# Patient Record
Sex: MALE | Race: WHITE | Employment: FULL TIME | URBAN - METROPOLITAN AREA
[De-identification: names, ages, dates, MRNs, and addresses within clinical notes are randomized per-mention and may not be internally consistent; named-entity substitution may affect disease eponyms.]

---

## 2019-08-12 ENCOUNTER — OFFICE VISIT (OUTPATIENT)
Dept: FAMILY MEDICINE | Facility: CLINIC | Age: 44
End: 2019-08-12
Payer: COMMERCIAL

## 2019-08-12 VITALS
HEART RATE: 85 BPM | HEIGHT: 72 IN | WEIGHT: 306 LBS | SYSTOLIC BLOOD PRESSURE: 138 MMHG | RESPIRATION RATE: 16 BRPM | DIASTOLIC BLOOD PRESSURE: 92 MMHG | TEMPERATURE: 97.4 F | BODY MASS INDEX: 41.45 KG/M2 | OXYGEN SATURATION: 96 %

## 2019-08-12 DIAGNOSIS — R03.0 ELEVATED BP WITHOUT DIAGNOSIS OF HYPERTENSION: ICD-10-CM

## 2019-08-12 DIAGNOSIS — E11.9 TYPE 2 DIABETES MELLITUS WITHOUT COMPLICATION, WITHOUT LONG-TERM CURRENT USE OF INSULIN (H): Primary | ICD-10-CM

## 2019-08-12 DIAGNOSIS — M77.11 LATERAL EPICONDYLITIS, RIGHT ELBOW: ICD-10-CM

## 2019-08-12 DIAGNOSIS — E66.01 MORBID OBESITY (H): ICD-10-CM

## 2019-08-12 LAB — HBA1C MFR BLD: 7.5 % (ref 0–5.6)

## 2019-08-12 PROCEDURE — 80061 LIPID PANEL: CPT | Performed by: NURSE PRACTITIONER

## 2019-08-12 PROCEDURE — 99207 C FOOT EXAM  NO CHARGE: CPT | Performed by: NURSE PRACTITIONER

## 2019-08-12 PROCEDURE — 82043 UR ALBUMIN QUANTITATIVE: CPT | Performed by: NURSE PRACTITIONER

## 2019-08-12 PROCEDURE — 99204 OFFICE O/P NEW MOD 45 MIN: CPT | Performed by: NURSE PRACTITIONER

## 2019-08-12 PROCEDURE — 36415 COLL VENOUS BLD VENIPUNCTURE: CPT | Performed by: NURSE PRACTITIONER

## 2019-08-12 PROCEDURE — 83036 HEMOGLOBIN GLYCOSYLATED A1C: CPT | Performed by: NURSE PRACTITIONER

## 2019-08-12 PROCEDURE — 84443 ASSAY THYROID STIM HORMONE: CPT | Performed by: NURSE PRACTITIONER

## 2019-08-12 PROCEDURE — 80053 COMPREHEN METABOLIC PANEL: CPT | Performed by: NURSE PRACTITIONER

## 2019-08-12 ASSESSMENT — MIFFLIN-ST. JEOR: SCORE: 2307.04

## 2019-08-12 NOTE — LETTER
[address]  August 13, 2019      [place] [address]           Phone :  [phone]          Fax:  [phone]  Jesus Davenport  [address] [place] [id number]      Jesus,     Please find a copy of your recent lab results enclosed.     To summarize your results:     Your microalbumin screening is normal.  This test is used to detect early signs of kidney damage in patients with an increased risk of kidney disease, such as those with diabetes or high blood pressure.    Your thyroid is normal.     Your electrolytes, kidney and liver tests are normal.     Blood sugar is elevated, but consistent with having diabetes.     If you have any questions please let me know.     Sincerely,     Anabella Campbell CNP/krs

## 2019-08-12 NOTE — PROGRESS NOTES
Subjective     Jesus Davenport is a 44 year old male who presents to clinic today for the following health issues:    LIZ Lee is a new patient who is here for medication refills today.   Previous care was at a clinic in [place].   He has since moved to [place].    He was diagnosed with T2DM 2 years ago.   As far as he recalls his diabetes has been well controlled.    He does not have high cholesterol or HTN.  He is only on medication for diabetes.   When he took a higher dose of metfromin he had a lot of GI issues (diarrhea).   He has been under more stress lately with the move and notes he has gained 30# in the last several months.   Typically he exercises regularly, but lately he has not been exercising.  Diet is fair.   Does snack during the day, but doesn't snack at night.    Drinks a lot of water and sugary beverages very rarely.         Diabetes Follow-up      How often are you checking your blood sugar? Not at all    What time of day are you checking your blood sugars (select all that apply)?  n/a    Have you had any blood sugars above 200?      Have you had any blood sugars below 70?      What symptoms do you notice when your blood sugar is low?  None    What concerns do you have today about your diabetes? None     Do you have any of these symptoms? (Select all that apply)  Weight gain     Have you had a diabetic eye exam in the last 12 months? No    Diabetes Management Resources      BP Readings from Last 2 Encounters:   08/12/19 (!) 138/92     Hemoglobin A1C (%)   Date Value   08/12/2019 7.5 (H)       Musculoskeletal problem/pain      Duration: x 2 months    Description  Location: right elbow    Intensity:  mild, moderate    Accompanying signs and symptoms: weakness of forearm and loss of  sometimes    History  Previous similar problem: no   Previous evaluation:  none    Precipitating or alleviating factors:  Trauma or overuse: YES- over use  Aggravating factors include: lifting up certain weight of  "things    Therapies tried and outcome: nothing      Current Outpatient Medications   Medication Sig Dispense Refill     metFORMIN (GLUCOPHAGE) 500 MG tablet Take 1 tablet (500 mg) by mouth daily (with dinner) 90 tablet 1     sitagliptin (JANUVIA) 100 MG tablet Take 1 tablet (100 mg) by mouth daily 90 tablet 1     Allergies   Allergen Reactions     Penicillins        Reviewed and updated as needed this visit by Provider         Review of Systems   ROS COMP: Constitutional, HEENT, cardiovascular, pulmonary, GI, , musculoskeletal, neuro, skin, endocrine and psych systems are negative, except as otherwise noted.      Objective    BP (!) 138/92   Pulse 85   Temp 97.4  F (36.3  C) (Oral)   Resp 16   Ht 1.822 m (5' 11.75\")   Wt 138.8 kg (306 lb)   SpO2 96%   BMI 41.79 kg/m    Body mass index is 41.79 kg/m .  Physical Exam   GENERAL: healthy, alert and no distress  EYES: Eyes grossly normal to inspection, PERRL and conjunctivae and sclerae normal  HENT: ear canals and TM's normal, nose and mouth without ulcers or lesions  NECK: no adenopathy, no asymmetry, masses, or scars and thyroid normal to palpation  RESP: lungs clear to auscultation - no rales, rhonchi or wheezes  CV: regular rate and rhythm, normal S1 S2, no S3 or S4, no murmur, click or rub, no peripheral edema and peripheral pulses strong  ABDOMEN: soft, nontender, no hepatosplenomegaly, no masses and bowel sounds normal  MS: no gross musculoskeletal defects noted, no edema, R elbow: FROM, lateral tenderness  SKIN: no suspicious lesions or rashes  NEURO: Normal strength and tone, mentation intact and speech normal  PSYCH: mentation appears normal, affect normal/bright  Diabetic foot exam: normal DP and PT pulses, no trophic changes or ulcerative lesions, normal sensory exam and normal monofilament exam    Diagnostic Test Results:  Results for orders placed or performed in visit on 08/12/19   Hemoglobin A1c   Result Value Ref Range    Hemoglobin A1C 7.5 (H) " 0 - 5.6 %             Assessment & Plan     1. Type 2 diabetes mellitus without complication, without long-term current use of insulin (H)  Labs today.  A1c looks good and likely would be better if he hadn't recently been gaining weight.  Discussed diet and he is in agreement to meet with diabetic educators.  Ensure plenty of water, try intermittent fasting.  Be mindful of snacking.  Increase exercise.  Due for eye exam.   - Hemoglobin A1c  - Comprehensive metabolic panel  - TSH with free T4 reflex  - Albumin Random Urine Quantitative with Creat Ratio  - DIABETES EDUCATOR REFERRAL  - OPTOMETRY REFERRAL  - sitagliptin (JANUVIA) 100 MG tablet; Take 1 tablet (100 mg) by mouth daily  Dispense: 90 tablet; Refill: 1  - metFORMIN (GLUCOPHAGE) 500 MG tablet; Take 1 tablet (500 mg) by mouth daily (with dinner)  Dispense: 90 tablet; Refill: 1    2. Elevated BP without diagnosis of hypertension  Will need to keep an eye on this.  He reports his BP has always been great.     3. Lateral epicondylitis, right elbow  Rest, ice, elbow strap, NSAID.  If not getting better in next 4-6 weeks can have him see ortho.     4. Morbid obesity (H)  Has been interested in bariatric surgery in the past.  Found out insurance will now cover it.   - BARIATRIC ADULT REFERRAL       Return in about 6 months (around 2/12/2020) for diabetes.    KATHRYN Barton [facility]

## 2019-08-13 ENCOUNTER — TELEPHONE (OUTPATIENT)
Dept: FAMILY MEDICINE | Facility: CLINIC | Age: 44
End: 2019-08-13

## 2019-08-13 DIAGNOSIS — E11.9 TYPE 2 DIABETES MELLITUS WITHOUT COMPLICATION, WITHOUT LONG-TERM CURRENT USE OF INSULIN (H): Primary | ICD-10-CM

## 2019-08-13 LAB
ALBUMIN SERPL-MCNC: 3.3 G/DL (ref 3.4–5)
ALP SERPL-CCNC: 111 U/L (ref 40–150)
ALT SERPL W P-5'-P-CCNC: 38 U/L (ref 0–70)
ANION GAP SERPL CALCULATED.3IONS-SCNC: 9 MMOL/L (ref 3–14)
AST SERPL W P-5'-P-CCNC: 23 U/L (ref 0–45)
BILIRUB SERPL-MCNC: 0.4 MG/DL (ref 0.2–1.3)
BUN SERPL-MCNC: 14 MG/DL (ref 7–30)
CALCIUM SERPL-MCNC: 8.5 MG/DL (ref 8.5–10.1)
CHLORIDE SERPL-SCNC: 106 MMOL/L (ref 94–109)
CHOLEST SERPL-MCNC: 140 MG/DL
CO2 SERPL-SCNC: 25 MMOL/L (ref 20–32)
CREAT SERPL-MCNC: 0.87 MG/DL (ref 0.66–1.25)
CREAT UR-MCNC: 124 MG/DL
GFR SERPL CREATININE-BSD FRML MDRD: >90 ML/MIN/{1.73_M2}
GLUCOSE SERPL-MCNC: 177 MG/DL (ref 70–99)
HDLC SERPL-MCNC: 32 MG/DL
LDLC SERPL CALC-MCNC: 78 MG/DL
MICROALBUMIN UR-MCNC: 8 MG/L
MICROALBUMIN/CREAT UR: 6.48 MG/G CR (ref 0–17)
NONHDLC SERPL-MCNC: 108 MG/DL
POTASSIUM SERPL-SCNC: 4.2 MMOL/L (ref 3.4–5.3)
PROT SERPL-MCNC: 7.2 G/DL (ref 6.8–8.8)
SODIUM SERPL-SCNC: 140 MMOL/L (ref 133–144)
TRIGL SERPL-MCNC: 149 MG/DL
TSH SERPL DL<=0.005 MIU/L-ACNC: 1.49 MU/L (ref 0.4–4)

## 2019-08-13 NOTE — TELEPHONE ENCOUNTER
Diabetes Education Scheduling Outreach #1:    Call to patient to schedule. Left message with phone number to call to schedule.    Plan for 2nd outreach attempt within 1 week.    Deysi Ricardo  [facility]  Diabetes and Nutrition Scheduling

## 2019-09-03 ENCOUNTER — ALLIED HEALTH/NURSE VISIT (OUTPATIENT)
Dept: EDUCATION SERVICES | Facility: CLINIC | Age: 44
End: 2019-09-03
Payer: COMMERCIAL

## 2019-09-03 ENCOUNTER — TELEPHONE (OUTPATIENT)
Dept: EDUCATION SERVICES | Facility: CLINIC | Age: 44
End: 2019-09-03

## 2019-09-03 VITALS — BODY MASS INDEX: 42.38 KG/M2 | WEIGHT: 310.3 LBS

## 2019-09-03 DIAGNOSIS — E11.9 TYPE 2 DIABETES MELLITUS WITHOUT COMPLICATION, WITHOUT LONG-TERM CURRENT USE OF INSULIN (H): Primary | ICD-10-CM

## 2019-09-03 PROCEDURE — G0108 DIAB MANAGE TRN  PER INDIV: HCPCS

## 2019-09-03 NOTE — PROGRESS NOTES
"Diabetes Self-Management Education & Support    Diabetes Education Self Management & Training    SUBJECTIVE/OBJECTIVE:  Presents for: Individual review  Accompanied by: Self  Diabetes education in the past 24mo: No  Diabetes type: Type 2  Disease course: Stable  Diabetes management related comments/concerns: weight gain over the last couple months, other than stress not sure why.  Transportation concerns: No  Other concerns:: None  Cultural Influences/Ethnic Background:  American      Diabetes Symptoms & Complications  Blurred vision: No  Fatigue: Yes  Neuropathy: No  Foot ulcerations: No  Polydipsia: No  Polyphagia: No  Polyuria: No  Visual change: No  Weakness: Yes  Weight loss: No  Slow healing wounds: No  Recent Infection(s): No  Other: No  Symptom course: Stable  Weight trend: Increasing rapidly  Autonomic neuropathy: No  CVA: No  Heart disease: No  Nephropathy: No  Peripheral neuropathy: No  Peripheral Vascular Disease: No  Retinopathy: No  Sexual dysfunction: Yes    Patient Problem List and Family Medical History reviewed for relevant medical history, current medical status, and diabetes risk factors.    Vitals:  Wt 140.8 kg (310 lb 4.8 oz)   BMI 42.38 kg/m    Estimated body mass index is 42.38 kg/m  as calculated from the following:    Height as of 8/12/19: 1.822 m (5' 11.75\").    Weight as of this encounter: 140.8 kg (310 lb 4.8 oz).   Last 3 BP:   BP Readings from Last 3 Encounters:   08/12/19 (!) 138/92       History   Smoking Status     Never Smoker   Smokeless Tobacco     Never Used       Labs:  Lab Results   Component Value Date    A1C 7.5 08/12/2019     Lab Results   Component Value Date     08/12/2019     Lab Results   Component Value Date    LDL 78 08/12/2019     HDL Cholesterol   Date Value Ref Range Status   08/12/2019 32 (L) >39 mg/dL Final   ]  GFR Estimate   Date Value Ref Range Status   08/12/2019 >90 >60 mL/min/[1.73_m2] Final     Comment:     Non  GFR Calc  Starting " 12/18/2018, serum creatinine based estimated GFR (eGFR) will be   calculated using the Chronic Kidney Disease Epidemiology Collaboration   (CKD-EPI) equation.       GFR Estimate If Black   Date Value Ref Range Status   08/12/2019 >90 >60 mL/min/[1.73_m2] Final     Comment:      GFR Calc  Starting 12/18/2018, serum creatinine based estimated GFR (eGFR) will be   calculated using the Chronic Kidney Disease Epidemiology Collaboration   (CKD-EPI) equation.       Lab Results   Component Value Date    CR 0.87 08/12/2019     No results found for: MICROALBUMIN    Healthy Eating  Healthy Eating Assessed Today: Yes  Cultural/Rastafarian diet restrictions?: No  Meal planning: None  Meals include: Breakfast, Lunch, Dinner, Snacks  Breakfast: cottage cheese and blueberies, OR honey nut cheerios with almond milk  Lunch: salad Or leftovers Or sandwich  Dinner: medeteranian Monday, Tuesday- 4 tacos Or chicken, veggies OR throwback thursday- pork chops/meatloaf, vegetables,  fish friday  Snacks: cheez its, tortilla chips and salsa, M&M's, cookies  Beverages: Water, Soda(coke zero)  Has patient met with a dietitian in the past?: No    Being Active  Being Active Assessed Today: Yes  Exercise:: Currently not exercising  Barrier to exercise: Time, Access    Monitoring  Monitoring Assessed Today: Yes  Did patient bring glucose meter to appointment? : No  Blood Glucose Meter: Unknown(does not have a meter and not interested in anythink that pokes fingers)  Home Glucose (Sugar) Monitoring: Never      Taking Medications  Diabetes Medication(s)     Biguanides       metFORMIN (GLUCOPHAGE) 500 MG tablet    Take 1 tablet (500 mg) by mouth daily (with dinner)    Dipeptidyl Peptidase-4 (DPP-4) Inhibitors       sitagliptin (JANUVIA) 100 MG tablet    Take 1 tablet (100 mg) by mouth daily          Taking Medication Assessed Today: Yes  Current Treatments: Oral Agent (dual therapy)  Problems taking diabetes medications regularly?:  No  Diabetes medication side effects?: No  Treatment Compliance: All of the time    Problem Solving  Problem Solving Assessed Today: Yes  Hypoglycemia Frequency: Daily  Hypoglycemia Treatment: Candy  Patient carries a carbohydrate source: No  Medical alert: No  Severe weather/disaster plan for diabetes management?: No  DKA prevention plan?: No  Sick day plan for diabetes management?: (P) No    Hypoglycemia symptoms  Confusion: No  Dizziness or Light-Headedness: No  Headaches: No  Hunger: Yes  Mood changes: Yes  Nervousness/Anxiety: Yes  Sleepiness: Yes  Speech difficulty: No  Sweats: No  Tremors: No    Hypoglycemia Complications  Blackouts: No  Hospitalization: No  Nocturnal hypoglycemia: No  Required assistance: No  Required glucagon injection: No  Seizures: No    Reducing Risks  Diabetes Risks: Age over 45 years, Sedentary Lifestyle, Family History  CAD Risks: Family history, Obesity, Male sex, Diabetes Mellitus  Has dilated eye exam at least once a year?: No  Sees dentist every 6 months?: No  Sees podiatrist (foot doctor)?: No(PCP checks feet)    Healthy Coping  Healthy Coping Assessed Today: Yes  Emotional response to diabetes: Ready to learn  Informal Support system:: Partner  Stage of change: PREPARATION (Decided to change - considering how)  Difficulty affording diabetes management supplies?: No  Patient Activation Measure Survey Score:  LAKIA Score (Last Two) 8/12/2019   LAKIA Raw Score 32   Activation Score 62.6   LAKIA Level 3       ASSESSMENT:  Patient is concerned recent weight gain, he does not feel it's related to his lifestyle.  He would benefit from some lifestyle changes including healthy eating and increased activity. Recommend he start tracking his food intake and activity to see if it is related.    Patient thinks he is having low blood sugars on a daily basis due to not feeling well, his current medication regimen is not likely to cause hypoglycemia, he may be experiencing  hyperglycemia.  He would  "benefit from monitoring glucose but is not willing to \"poke himself\", recommend he try the herb personal sensor for monitoring. Information provided to patient and he is interested in this option.  Request sent to pcp.    Patient's most recent   Lab Results   Component Value Date    A1C 7.5 08/12/2019    is not meeting goal of <7.0    INTERVENTION:   Diabetes knowledge and skills assessment:     Patient is knowledgeable in diabetes management concepts related to: NA    Patient needs further education on the following diabetes management concepts: Healthy Eating, Being Active, Monitoring, Taking Medication, Problem Solving, Reducing Risks and Healthy Coping    Based on learning assessment above, most appropriate setting for further diabetes education would be: Group class or Individual setting.    Education provided today on:  AADE Self-Care Behaviors:  Diabetes Pathophysiology  Healthy Eating: consistency in amount, composition, and timing of food intake, weight reduction, portion control, plate planning method and label reading  Being Active: relationship to blood glucose, describe appropriate activity program and precautions to take  Monitoring: benefits of monitoring, individual blood glucose targets and available devices on the market for monitoring glucose, SBG monitor vs CGM  Taking Medication: action of prescribed medication and side effects of prescribed medications  Problem Solving: high blood glucose - causes, signs/symptoms, treatment and prevention, low blood glucose - causes, signs/symptoms, treatment and prevention and when to call health care provider  Reducing Risks: A1C - goals, relating to blood glucose levels, how often to check  Healthy Coping: benefits of making appropriate lifestyle changes    Opportunities for ongoing education and support in diabetes-self management were discussed.    Pt verbalized understanding of concepts discussed and recommendations provided today.       Education " Materials Provided:  [facility] Understanding Diabetes Booklet, BG Log Sheet, My Plate Planner, 2-4 carbohydrate choice meal plans, snack ideas and herb personal sensor brochure    PLAN:  See Patient Instructions for co-developed, patient-stated behavior change goals.  AVS printed and provided to patient today. See Follow-Up section for recommended follow-up.    Ciera Hinojosa RN,CDE   Time Spent: 65 minutes  Encounter Type: Individual    Any diabetes medication dose changes were made via the CDE Protocol and Collaborative Practice Agreement with the patient's referring provider. A copy of this encounter was shared with the provider.

## 2019-09-03 NOTE — PATIENT INSTRUCTIONS
Care Plan:  Meal Plan Recommendation: eat 3 meals a day, have small snacks between meals, if needed, use portion control and use plate planning method  Exercise / activity plan: track your daily steps   Check blood sugars - check with your pharmacy regarding the katty sensor    Consider a phone jah for tracking food intake such as [employer] or [employer] personal sensor for tracking your glucose.  You can check with [email or web address] for discounts and if the cost is more than $75 then call Katty for additional discount    Follow up:  1 month     Bring blood glucose meter and logbook with you to all doctor and follow-up appointments.     [facility] [facility]:  For Your Diabetes or Nutrition Education Appointments Call:  [phone]   For Diabetes or Nutrition Related Questions Call or Email:   [phone]  [email or web address]  Fax: [phone]   If you need a medication refill please contact your pharmacy. Please allow 3 business days for your refills to be completed.     Instructions for emailing the Diabetes Educators    If you need to communicate a non-urgent message to a Diabetes Educator via email, please send to [email or web address].    Please follow the following email guidelines:    Subject line: Secure: your clinic name (example: Secure: [facility])  In the email please include: First name, middle initial, last name and date of birth.    We will be in touch with you within one (1) business day.

## 2019-09-03 NOTE — TELEPHONE ENCOUNTER
Please see my notes from today.  Patient would benefit from a personal herb sensor.  Jesus is aware of the cost and the possibility insurance may not cover it. I provided the patient with an online discount opportunity through [email or web address] to help with the cost if the insurance does not cover it.   If you agree please send the script in  and route back to me or indicate alternate plan.    Ciera Hinojosa RN,CDE

## 2019-09-03 NOTE — LETTER
"    9/3/2019         RE: Jesus Davenport  [address] [place] [id number]        Dear Colleague,    Thank you for referring your patient, Jesus Davenport, to the [facility]. Please see a copy of my visit note below.    Diabetes Self-Management Education & Support    Diabetes Education Self Management & Training    SUBJECTIVE/OBJECTIVE:  Presents for: Individual review  Accompanied by: Self  Diabetes education in the past 24mo: No  Diabetes type: Type 2  Disease course: Stable  Diabetes management related comments/concerns: weight gain over the last couple months, other than stress not sure why.  Transportation concerns: No  Other concerns:: None  Cultural Influences/Ethnic Background:  American      Diabetes Symptoms & Complications  Blurred vision: No  Fatigue: Yes  Neuropathy: No  Foot ulcerations: No  Polydipsia: No  Polyphagia: No  Polyuria: No  Visual change: No  Weakness: Yes  Weight loss: No  Slow healing wounds: No  Recent Infection(s): No  Other: No  Symptom course: Stable  Weight trend: Increasing rapidly  Autonomic neuropathy: No  CVA: No  Heart disease: No  Nephropathy: No  Peripheral neuropathy: No  Peripheral Vascular Disease: No  Retinopathy: No  Sexual dysfunction: Yes    Patient Problem List and Family Medical History reviewed for relevant medical history, current medical status, and diabetes risk factors.    Vitals:  Wt 140.8 kg (310 lb 4.8 oz)   BMI 42.38 kg/m     Estimated body mass index is 42.38 kg/m  as calculated from the following:    Height as of 8/12/19: 1.822 m (5' 11.75\").    Weight as of this encounter: 140.8 kg (310 lb 4.8 oz).   Last 3 BP:   BP Readings from Last 3 Encounters:   08/12/19 (!) 138/92       History   Smoking Status     Never Smoker   Smokeless Tobacco     Never Used       Labs:  Lab Results   Component Value Date    A1C 7.5 08/12/2019     Lab Results   Component Value Date     08/12/2019     Lab Results   Component Value Date    LDL " 78 08/12/2019     HDL Cholesterol   Date Value Ref Range Status   08/12/2019 32 (L) >39 mg/dL Final   ]  GFR Estimate   Date Value Ref Range Status   08/12/2019 >90 >60 mL/min/[1.73_m2] Final     Comment:     Non  GFR Calc  Starting 12/18/2018, serum creatinine based estimated GFR (eGFR) will be   calculated using the Chronic Kidney Disease Epidemiology Collaboration   (CKD-EPI) equation.       GFR Estimate If Black   Date Value Ref Range Status   08/12/2019 >90 >60 mL/min/[1.73_m2] Final     Comment:      GFR Calc  Starting 12/18/2018, serum creatinine based estimated GFR (eGFR) will be   calculated using the Chronic Kidney Disease Epidemiology Collaboration   (CKD-EPI) equation.       Lab Results   Component Value Date    CR 0.87 08/12/2019     No results found for: MICROALBUMIN    Healthy Eating  Healthy Eating Assessed Today: Yes  Cultural/Tenriism diet restrictions?: No  Meal planning: None  Meals include: Breakfast, Lunch, Dinner, Snacks  Breakfast: cottage cheese and blueberies, OR honey nut cheerios with almond milk  Lunch: salad Or leftovers Or sandwich  Dinner: medeteranian Monday, Tuesday- 4 tacos Or chicken, veggies OR throwback thursday- pork chops/meatloaf, vegetables,  fish friday  Snacks: cheez its, tortilla chips and salsa, M&M's, cookies  Beverages: Water, Soda(coke zero)  Has patient met with a dietitian in the past?: No    Being Active  Being Active Assessed Today: Yes  Exercise:: Currently not exercising  Barrier to exercise: Time, Access    Monitoring  Monitoring Assessed Today: Yes  Did patient bring glucose meter to appointment? : No  Blood Glucose Meter: Unknown(does not have a meter and not interested in anythink that pokes fingers)  Home Glucose (Sugar) Monitoring: Never      Taking Medications  Diabetes Medication(s)     Biguanides       metFORMIN (GLUCOPHAGE) 500 MG tablet    Take 1 tablet (500 mg) by mouth daily (with dinner)    Dipeptidyl Peptidase-4  (DPP-4) Inhibitors       sitagliptin (JANUVIA) 100 MG tablet    Take 1 tablet (100 mg) by mouth daily          Taking Medication Assessed Today: Yes  Current Treatments: Oral Agent (dual therapy)  Problems taking diabetes medications regularly?: No  Diabetes medication side effects?: No  Treatment Compliance: All of the time    Problem Solving  Problem Solving Assessed Today: Yes  Hypoglycemia Frequency: Daily  Hypoglycemia Treatment: Candy  Patient carries a carbohydrate source: No  Medical alert: No  Severe weather/disaster plan for diabetes management?: No  DKA prevention plan?: No  Sick day plan for diabetes management?: (P) No    Hypoglycemia symptoms  Confusion: No  Dizziness or Light-Headedness: No  Headaches: No  Hunger: Yes  Mood changes: Yes  Nervousness/Anxiety: Yes  Sleepiness: Yes  Speech difficulty: No  Sweats: No  Tremors: No    Hypoglycemia Complications  Blackouts: No  Hospitalization: No  Nocturnal hypoglycemia: No  Required assistance: No  Required glucagon injection: No  Seizures: No    Reducing Risks  Diabetes Risks: Age over 45 years, Sedentary Lifestyle, Family History  CAD Risks: Family history, Obesity, Male sex, Diabetes Mellitus  Has dilated eye exam at least once a year?: No  Sees dentist every 6 months?: No  Sees podiatrist (foot doctor)?: No(PCP checks feet)    Healthy Coping  Healthy Coping Assessed Today: Yes  Emotional response to diabetes: Ready to learn  Informal Support system:: Partner  Stage of change: PREPARATION (Decided to change - considering how)  Difficulty affording diabetes management supplies?: No  Patient Activation Measure Survey Score:  LAKIA Score (Last Two) 8/12/2019   LAKIA Raw Score 32   Activation Score 62.6   LAKIA Level 3       ASSESSMENT:  Patient is concerned recent weight gain, he does not feel it's related to his lifestyle.  He would benefit from some lifestyle changes including healthy eating and increased activity. Recommend he start tracking his food intake  "and activity to see if it is related.    Patient thinks he is having low blood sugars on a daily basis due to not feeling well, his current medication regimen is not likely to cause hypoglycemia, he may be experiencing  hyperglycemia.  He would benefit from monitoring glucose but is not willing to \"poke himself\", recommend he try the herb personal sensor for monitoring. Information provided to patient and he is interested in this option.  Request sent to pcp.    Patient's most recent   Lab Results   Component Value Date    A1C 7.5 08/12/2019    is not meeting goal of <7.0    INTERVENTION:   Diabetes knowledge and skills assessment:     Patient is knowledgeable in diabetes management concepts related to: NA    Patient needs further education on the following diabetes management concepts: Healthy Eating, Being Active, Monitoring, Taking Medication, Problem Solving, Reducing Risks and Healthy Coping    Based on learning assessment above, most appropriate setting for further diabetes education would be: Group class or Individual setting.    Education provided today on:  AADE Self-Care Behaviors:  Diabetes Pathophysiology  Healthy Eating: consistency in amount, composition, and timing of food intake, weight reduction, portion control, plate planning method and label reading  Being Active: relationship to blood glucose, describe appropriate activity program and precautions to take  Monitoring: benefits of monitoring, individual blood glucose targets and available devices on the market for monitoring glucose, SBG monitor vs CGM  Taking Medication: action of prescribed medication and side effects of prescribed medications  Problem Solving: high blood glucose - causes, signs/symptoms, treatment and prevention, low blood glucose - causes, signs/symptoms, treatment and prevention and when to call health care provider  Reducing Risks: A1C - goals, relating to blood glucose levels, how often to check  Healthy Coping: benefits " of making appropriate lifestyle changes    Opportunities for ongoing education and support in diabetes-self management were discussed.    Pt verbalized understanding of concepts discussed and recommendations provided today.       Education Materials Provided:  [facility] Understanding Diabetes Booklet, BG Log Sheet, My Plate Planner, 2-4 carbohydrate choice meal plans, snack ideas and herb personal sensor brochure    PLAN:  See Patient Instructions for co-developed, patient-stated behavior change goals.  AVS printed and provided to patient today. See Follow-Up section for recommended follow-up.    Ciera Hinojosa RN,CDE   Time Spent: 65 minutes  Encounter Type: Individual    Any diabetes medication dose changes were made via the CDE Protocol and Collaborative Practice Agreement with the patient's referring provider. A copy of this encounter was shared with the provider.

## 2019-09-06 RX ORDER — FLASH GLUCOSE SCANNING READER
1 EACH MISCELLANEOUS CONTINUOUS
Qty: 1 DEVICE | Refills: 0 | Status: SHIPPED | OUTPATIENT
Start: 2019-09-06

## 2019-09-06 RX ORDER — FLASH GLUCOSE SENSOR
1 KIT MISCELLANEOUS CONTINUOUS
Qty: 2 EACH | Refills: 11 | Status: SHIPPED | OUTPATIENT
Start: 2019-09-06

## 2019-10-04 ENCOUNTER — OFFICE VISIT (OUTPATIENT)
Dept: SURGERY | Facility: CLINIC | Age: 44
End: 2019-10-04
Payer: COMMERCIAL

## 2019-10-04 VITALS
BODY MASS INDEX: 41.19 KG/M2 | HEIGHT: 72 IN | SYSTOLIC BLOOD PRESSURE: 122 MMHG | WEIGHT: 304.1 LBS | OXYGEN SATURATION: 96 % | DIASTOLIC BLOOD PRESSURE: 90 MMHG | HEART RATE: 89 BPM

## 2019-10-04 VITALS — BODY MASS INDEX: 41.19 KG/M2 | WEIGHT: 304.1 LBS | HEIGHT: 72 IN

## 2019-10-04 DIAGNOSIS — E66.01 MORBID OBESITY WITH BMI OF 40.0-44.9, ADULT (H): ICD-10-CM

## 2019-10-04 DIAGNOSIS — Z13.21 SCREENING FOR ENDOCRINE, NUTRITIONAL, METABOLIC AND IMMUNITY DISORDER: ICD-10-CM

## 2019-10-04 DIAGNOSIS — Z13.29 SCREENING FOR ENDOCRINE, NUTRITIONAL, METABOLIC AND IMMUNITY DISORDER: ICD-10-CM

## 2019-10-04 DIAGNOSIS — R03.0 ELEVATED BP WITHOUT DIAGNOSIS OF HYPERTENSION: ICD-10-CM

## 2019-10-04 DIAGNOSIS — E11.9 TYPE 2 DIABETES MELLITUS WITHOUT COMPLICATION, WITHOUT LONG-TERM CURRENT USE OF INSULIN (H): Primary | ICD-10-CM

## 2019-10-04 DIAGNOSIS — Z13.0 SCREENING FOR ENDOCRINE, NUTRITIONAL, METABOLIC AND IMMUNITY DISORDER: ICD-10-CM

## 2019-10-04 DIAGNOSIS — Z13.228 SCREENING FOR ENDOCRINE, NUTRITIONAL, METABOLIC AND IMMUNITY DISORDER: ICD-10-CM

## 2019-10-04 DIAGNOSIS — Z13.0 SCREENING FOR IRON DEFICIENCY ANEMIA: ICD-10-CM

## 2019-10-04 PROCEDURE — 97802 MEDICAL NUTRITION INDIV IN: CPT | Performed by: DIETITIAN, REGISTERED

## 2019-10-04 PROCEDURE — 99204 OFFICE O/P NEW MOD 45 MIN: CPT | Performed by: PHYSICIAN ASSISTANT

## 2019-10-04 ASSESSMENT — MIFFLIN-ST. JEOR
SCORE: 2302.39
SCORE: 2302.39

## 2019-10-04 NOTE — LETTER
Jesus Davenport  October 4, 2019        Bariatric Task List      Lose 5 lbs prior to surgery.  Goal Weight: 299 lbs  Have preoperative laboratory tests drawn.     Psychological Evaluation with MMPI and clearance for weight loss surgery.    Achieve clearance from dietitian to see surgeon.    A total of 4 structured dietitian weight loss visits are required by your insurance.    Bariatric Support group    See primary Re: elevated blood pressure    Talk with spouse about immediately post op

## 2019-10-04 NOTE — PROGRESS NOTES
"New Bariatric Surgery Consultation Note    2019    RE: Jesus Davenport  MR#: [id number]  : 1975      Referring provider:       10/4/2019   Who referred you? Anabella CONNOLLY       Chief Complaint/Reason for visit: evaluation for possible weight loss surgery    Dear Anabella Campbell, KATHRYN CNP (General),    I had the pleasure of seeing your patient, Jesus Davenport, to evaluate his obesity and consider him for possible weight loss surgery. As you know, Jesus Davenport is 44 year old.  He has a height of 6' 0\", a weight of 304 lbs 1.6 oz, and calculated Body mass index is 41.24 kg/m .        HISTORY OF PRESENT ILLNESS:  Weight Loss History Reviewed with Patient 10/4/2019   How long have you been overweight? Since puberty   What is the most that you have ever weighed? 315   What is the most weight you have lost? 60   I have tried the following methods to lose weight Watching portions or calories, Exercise, Atkins type diet (low carb/high protein), Prescription Medications   I have tried the following weight loss medications? (Check all that apply) Qysmia (phentermine + topiramate)   Have you ever had weight loss surgery? No     Was on phentermine about 3 years ago.  Took it for 6-8 months.       CO-MORBIDITIES OF OBESITY INCLUDE:     10/4/2019   I have the following co-morbidities associated with obesity: Type II Diabetes, Asthma   What was your most recent hemoglobin a1c  0   How many years have you had diabetes? 3       PAST MEDICAL HISTORY:  Past Medical History:   Diagnosis Date     Diabetes mellitus, type 2 (H)      Hypertension      Uncomplicated asthma        PAST SURGICAL HISTORY:  Past Surgical History:   Procedure Laterality Date     wisdom teeth       No personal history of blood clots or anesthesia concerns.  Mother had blood clot approx 5 years ago.  She is her 60s.  Patient does not know circumstances around this and prefers not to contact his family about it.        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 Yes   Who do you have in your support network that can be available to help you for the first 2 weeks after surgery? Yes   Who can you count on for support throughout your weight loss surgery journey? [employer]   Can you afford 3 meals a day?  Yes   Can you afford 50-60 dollars a month for vitamins? Yes       HABITS:     10/4/2019   How often do you drink alcohol? 2-4 times a month   If you do drink alcohol, how many drinks might you have in a day? (one drink = 5 oz. wine, 1 can/bottle of beer, 1 shot liquor) 1 or 2   Have you ever used any of the following nicotine products? No   Have you or are you currently using street drugs or prescription strength medication for which you do not have a prescription for? No   Do you have a history of chemical dependency (alcohol or drug abuse)? No       PSYCHOLOGICAL HISTORY:   Psychological History Reviewed With Patient 10/4/2019   Have you ever attempted suicide? Never.   Have you had thoughts of suicide in the past year? No   Have you ever been hospitalized for mental illness or a suicide attempt? Never.   Do you have a history of chronic pain? No   Have you ever been diagnosed with fibromyalgia? No   Are you currently seeing a therapist or counselor?  No   Are you currently seeing a psychiatrist? No       ROS:     10/4/2019   Skin:  None of the above   HEENT: None of these   Musculoskeletal: None of the above   Cardiovascular: None of the above   Pulmonary: None of the above   Gastrointestinal: Constipation   Genitourinary: None of the above   Hematological:  Family history of blood clots   Neurological: None of the above       EATING BEHAVIORS:     10/4/2019   Have you or anyone else thought that you had an eating disorder? No   Do you currently binge eat (eat a large amount of food in a short time)? No   Are you an emotional eater? Yes   Do you get up to eat after falling asleep? No       EXERCISE:     10/4/2019   How often do you exercise? 3 to 4 times per week   What is the duration of your exercise (in minutes)? 45 Minutes   What types of exercise do you do? walking, gym membership   What keeps you from being more active?  Lack of Time, Too tired       MEDICATIONS:  Current Outpatient Medications   Medication Sig Dispense Refill     albuterol (PROAIR HFA/PROVENTIL HFA/VENTOLIN HFA) 108 (90 BASE) MCG/ACT Inhaler Inhale 2 puffs into the lungs as needed for shortness of breath / dyspnea or wheezing       metFORMIN (GLUCOPHAGE) 500 MG tablet Take 1 tablet (500 mg) by mouth daily (with dinner) 90 tablet 1     sitagliptin (JANUVIA) 100 MG tablet Take 1 tablet (100 mg) by mouth daily 90 tablet 1     Continuous Blood Gluc  (FREESTYLE MARJORIE 14 DAY READER) AVELINO 1 each continuous 1 Device 0     Continuous Blood Gluc Sensor (FREESTYLE MARJORIE 14 DAY SENSOR) MISC 1 each continuous Change sensor every 14 days 2 each 11       ALLERGIES:  Allergies   Allergen Reactions     Penicillins        LABS/IMAGING/MEDICAL RECORDS REVIEW: labs in [facility]    PHYSICAL EXAM:  BP (!) 122/90   Pulse 89   Ht 6' (1.829 m)   Wt 304 lb 1.6 oz (137.9 kg)   SpO2 96%   BMI 41.24 kg/m      GENERAL:  Good development and normal affect in no acute distress. Patient is alert and orientated x 3 and answers all questions appropriately.  HEENT: Head is normocephalic, nontraumatic. Pupils equal and round without conjunctival injection. Neck is supple without lymphadenopathy, thyroidmegaly, or mass. Trachea midline. Dentition WNL. No missing teeth  CARDIOVASCULAR:  Regular rate and rhythm without murmurs,  rubs, or gallops.  RESPIRATORY: Lungs are clear to auscultation bilaterally with good breath sounds.  GASTROINTESTINAL: Abdomen is obese, nondistended, soft, nontender, without organomegaly or masses. No bruits.  LOWER EXTREMITIES: No LE edema bilaterally, no cyanosis, ulceration, or chronic venous stasis noted.  MUSCULOSKELETAL:  Moves all 4 extremities equal and strong. Has a normal gait.   NEUROLOGIC: Cranial nerves II-XII grossly intact.  SKIN: No intertriginous irritation or rash.       In summary, Jesus Davenport has Morbid obesity with Body mass index is 41.24 kg/m .  and the comorbidities stated above. He completed an informational seminar and is a candidate for the laparoscopic gastric sleeve.  He will have to complete the following pre-requisites:    Lose 5 lbs prior to surgery.  Goal Weight: 299 lbs  Have preoperative laboratory tests drawn.   Psychological Evaluation with MMPI and clearance for weight loss surgery.  Achieve clearance from dietitian to see surgeon.  A total of 4 structured dietitian weight loss visits are required by your insurance.  Bariatric Support group  See primary Re: elevated blood pressure  Talk with spouse about immediately post op      Today in the office we discussed patients medical history.  He admittingly stated there was a lot of information to take in. Discussed preoperative, perioperative, and postoperative processes, management, and follow up were addressed.  Also discussed GI anatomy.  Will let patient absorb the information and next month will discuss both the gastric bypass and gastric sleeve surgeries.  Will also talk about risks and benefits along with lifestyle modifications.           Sincerely,     Giovanni Hernandez PA-C    I spent a total of 45 minutes face to face with the patient during today's office visit. Over 50% of this time was spent counseling the patient and/or coordinating care.

## 2019-10-04 NOTE — PATIENT INSTRUCTIONS
Jesus to follow up with Primary Care provider regarding elevated blood pressure.                    Eat Better ? Move More ? Live Well    Eat 3 nutrient-rich meals each day     Don t skip meals--it will cause you to overeat later in the day!     Eating fiber (vegetables/fruits/whole grains) and protein with meals helps you stay full longer     Choose foods with less than 10 grams of sugar and 5 grams of fat per serving to prevent excess calories and weight re-gain   Eat around the same times each day to develop a routine eating schedule    Avoid snacking unless physically hungry.   Planned snacks: 1-2 times per day and no more than 150 calories    Eat protein first    Protein helps with healing, maintaining adequate muscle mass, reducing hunger and optimizing nutritional status    Aim for 60-80 grams of protein per day   Fill up on Fiber    Fiber comes from plants--fruits, veggies, whole grains, nuts/seeds and beans    Fiber is low in calories, high in phytonutrients and helps you stay full longer    Aim for 25-35 grams per day by eating fiber with meals and snacks  Eat [id number]    Take 20-30 minutes to eat each meal by taking small bites, chewing foods to applesauce consistency or 20-30 times before you swallow    Eating foods too fast can delay satiety/fullness signals and increase overeating   ? Slow down your eating by using toddler utensils, putting your fork/spoon down between bites and not watching TV or emailing during meals!   Keep a Journal          Writing down what you eat, how you feel and when you are active helps you identify new changes to work on from week to week          Look for ways to cut 100 calories from your current diet 2-3 times per day  Drink 64 ounces of 0-Calorie drinks between meals    Water    Zero calorie Propel  or Vitamin Water      So[employer] Lifewater  Zero Calories    Crystal Light , Sugar-Free Jim-Aid , and other sugar-free lemonade or flavored short    Keep Caffeine to less than  300mg per day ie: 3-6oz cups coffee     Work up to 45-60 minutes of physical activity most days of the week    Helps with losing weight and prevent regaining those extra pounds!     Do a combo of cardio (walking/water exercises) and strength training (lifting weights/Vinyasa yoga)    Avoid Mindless Eating    Be present when you eat--take note of the smell, taste and quality of your food    Make a list of alternative activities you could do to prevent eating out of boredom/stress  ? Go for a walk, call a friend, chew gum, paint your nails, re-organize the garage, etc

## 2019-10-04 NOTE — PROGRESS NOTES
New Bariatric Nutrition Consultation Note    Reason For Visit: Nutrition Assessment    Jesus Davenport is a 44 year old presenting today for new bariatric nutrition consult.  Pt is interested in laparoscopic sleeve gastrectomy.  Patient is accompanied by self.    Support System Reviewed With Patient 10/4/2019   Who do you have in your support network that can be available to help you for the first 2 weeks after surgery? Yes   Who can you count on for support throughout your weight loss surgery journey? Spouse       ANTHROPOMETRICS:  Estimated body mass index is 41.24 kg/m  as calculated from the following:    Height as of this encounter: 1.829 m (6').    Weight as of this encounter: 137.9 kg (304 lb 1.6 oz).    Required weight loss goal pre-op: 5 lbs from initial consult weight (goal weight 299 lbs or less before surgery)       10/4/2019   I have tried the following methods to lose weight Watching portions or calories, Exercise, Atkins type diet (low carb/high protein), Prescription Medications       Weight Loss Questions Reviewed With Patient 10/4/2019   How long have you been overweight? Since puberty       SUPPLEMENT INFORMATION:  None     NUTRITION HISTORY:  Recall Diet Questions Reviewed With Patient 10/4/2019   Describe what you typically consume for breakfast (typical or most recent): Cottage Cheese berries or cereal (7:30 AM) Up at 5:45-6:00    Describe what you typically consume for lunch (typical or most recent): Soup or salad (11:30 AM)   Describe what you typically consume for supper (typical or most recent): Chicken and Vegatables (6:00 PM)   Describe what you typically consume as snacks (typical or most recent): Meat stick or cheese or Cheezit (2:00 PM)   How many ounces of water, or other low calorie drinks, do you drink daily (8 oz=1 glass)? 24 oz   How often do you drink alcohol? 2-4 times a month   If you do drink alcohol, how many drinks might you have in a day? (one drink = 5 oz. wine, 1 can/bottle of  beer, 1 shot liquor) 1 or 2       Eating Habits 10/4/2019   Do you have any dietary restrictions? No   Do you currently binge eat (eat a large amount of food in a short time)? No   Are you an emotional eater? Yes   Do you get up to eat after falling asleep? No   What foods do you crave? Sugar and Meat       ADDITIONAL INFORMATION:  Patient recently moved to [place] from [place] and before that lived in [place].  In [place] he started thinking about having surgery (3 years) but his insurance did not cover at the time and them moved to [place].  Patient hopes to continue living in [place].  Patient does not know anyone that has had surgery.  Patient feels biggest struggle is food availability as if food is there he will eat it.  Patient feels he araya not have time to prepare meals (breakfast and lunch).  Patient appears overwhelmed with nutrition information as states the permanence of the diet guidelines.     Dining Out History Reviewed With Patient 10/4/2019   How often do you dine out? A couple of times a week.   Where do you dine out? (select all that apply) sit-down restaurants, take out   What types of food do you order when you dine out? Dinner Entre depends on location sometimes bar food       Physical Activity Reviewed With Patient 10/4/2019   How often do you exercise? 3 to 4 times per week   What is the duration of your exercise (in minutes)? 45 Minutes   What types of exercise do you do? walking, gym membership   What keeps you from being more active?  Lack of Time, Too tired       NUTRITION DIAGNOSIS:  Obesity r/t long history of self-monitoring deficit and excessive energy intake aeb BMI >30 kg/m2.    INTERVENTION:  Intervention Provided/Education Provided on post-op diet guidelines, vitamins/minerals essential post-operatively, GI anatomy of bariatric surgeries, ways to help prepare for post-op diet guidelines pre-operatively and portion/calorie-control.  Provided pt with list of goals.  Discussed stress eating with patient.  Patient  was not able to determine alternatives to emotional eating during the session. Informed patient he will be meeting with psychologist to discuss behaviors.        Questions Reviewed With Patient 10/4/2019   How ready are you to make changes regarding your weight? Number 1 = Not ready at all to make changes up to 10 = very ready. 8   How confident are you that you can change? 1 = Not confident that you will be successful making changes up to 10 = very confident. 7       Patient Understanding: good  Expected Compliance: fair-good    GOALS:  Begin complete multivitamin and mineral, calcium with vitamin D and vitamin D  (Patient was not able to determine goals that he could work on due to feeling overwhelmed with all the information)     Follow-Up:   Recommend 3 follow up visits to assist with lifestyle changes per insurance.    Time spent with patient: 50  minutes    Esteban Zavaleta, RD, LD  [facility] Outpatient Dietitian  [phone] (office phone)

## 2020-02-12 DIAGNOSIS — E11.9 TYPE 2 DIABETES MELLITUS WITHOUT COMPLICATION, WITHOUT LONG-TERM CURRENT USE OF INSULIN (H): ICD-10-CM

## 2020-02-14 NOTE — TELEPHONE ENCOUNTER
Metformin 500mg is being filled for 1 time refill only due to:  Patient needs to be seen because he is due for his 6 month diabetes check with lab work. Please contact patient to schedule an appointment.     Marva Ram RN

## 2020-03-11 ENCOUNTER — HEALTH MAINTENANCE LETTER (OUTPATIENT)
Age: 45
End: 2020-03-11

## 2021-01-03 ENCOUNTER — HEALTH MAINTENANCE LETTER (OUTPATIENT)
Age: 46
End: 2021-01-03

## 2021-04-25 ENCOUNTER — HEALTH MAINTENANCE LETTER (OUTPATIENT)
Age: 46
End: 2021-04-25

## 2021-08-15 ENCOUNTER — HEALTH MAINTENANCE LETTER (OUTPATIENT)
Age: 46
End: 2021-08-15

## 2021-10-10 ENCOUNTER — HEALTH MAINTENANCE LETTER (OUTPATIENT)
Age: 46
End: 2021-10-10

## 2021-12-05 ENCOUNTER — HEALTH MAINTENANCE LETTER (OUTPATIENT)
Age: 46
End: 2021-12-05

## 2022-05-21 ENCOUNTER — HEALTH MAINTENANCE LETTER (OUTPATIENT)
Age: 47
End: 2022-05-21

## 2022-09-18 ENCOUNTER — HEALTH MAINTENANCE LETTER (OUTPATIENT)
Age: 47
End: 2022-09-18

## 2023-01-29 ENCOUNTER — HEALTH MAINTENANCE LETTER (OUTPATIENT)
Age: 48
End: 2023-01-29

## 2023-06-04 ENCOUNTER — HEALTH MAINTENANCE LETTER (OUTPATIENT)
Age: 48
End: 2023-06-04

## 2023-10-08 ENCOUNTER — HEALTH MAINTENANCE LETTER (OUTPATIENT)
Age: 48
End: 2023-10-08

## 2024-02-25 ENCOUNTER — HEALTH MAINTENANCE LETTER (OUTPATIENT)
Age: 49
End: 2024-02-25